# Patient Record
Sex: FEMALE
[De-identification: names, ages, dates, MRNs, and addresses within clinical notes are randomized per-mention and may not be internally consistent; named-entity substitution may affect disease eponyms.]

---

## 2022-05-10 ENCOUNTER — TRANSCRIBE ORDERS (OUTPATIENT)
Dept: OTHER | Age: 62
End: 2022-05-10

## 2022-05-10 DIAGNOSIS — N83.292 COMPLEX CYST OF LEFT OVARY: Primary | ICD-10-CM

## 2022-05-11 ENCOUNTER — PATIENT OUTREACH (OUTPATIENT)
Dept: ONCOLOGY | Facility: CLINIC | Age: 62
End: 2022-05-11

## 2022-05-11 ENCOUNTER — DOCUMENTATION ONLY (OUTPATIENT)
Dept: ONCOLOGY | Facility: CLINIC | Age: 62
End: 2022-05-11

## 2022-05-11 NOTE — PROGRESS NOTES
Action May 11, 2022 11:57 AM ABT   Action Taken Records updated in CE, image request sent to [facility]     Action May 13, 2022 4:21 PM ABT   Action Taken Image from [facility] 04/19/22 US Pelvis received and resolved to PACS

## 2022-05-11 NOTE — PROGRESS NOTES
New Patient Hematology / Oncology Nurse Navigator Note     Referral Date: 5/10/22    Referring provider:   Inderjit Antoine   [facility]   [address] [address]   Phone: [phone]   Fax: [phone]         Referring Clinic/Organization: [facility]      Referred to: [facility]    Requested provider (if applicable): First available - did not specify     Evaluation for : Complex cyst of left ovary     Clinical History (per Nurse review of records provided):       4/19/22 Pelvic US showing (viewable in CE):  CONCLUSION:     1.  4.2 x 3.1 x 4.4 cm diameter hyperechoic left adnexal mass with suspected central cystic   changes with either posterior through transmission or possibly layering calcium.     Sonographically this is most consistent with a left ovarian dermoid, however, considering   patient's age group I would suggest proceeding with a pelvic MRI for further characterization.   2.  Endometrial stripe measures between 6 and 7.6 mm which is mildly prominent for   postmenopausal stage.  Endometrium and uterus otherwise demonstrates no other cause for   postmenopausal bleeding.     5/5/22 Path from Endometrial Biopsy showing:  Final Diagnosis     A. ENDOMETRIUM, BIOPSY  --PREDOMINANTLY MUCUS AND ENDOCERVIX WITH MINUTE DETACHED FRAGMENTS OF SUPERFICIAL ENDOMETRIUM  --NO HYPERPLASIA OR CARCINOMA IDENTIFIED ON VERY LIMITED SAMPLING     B. ENDOCERVIX, BIOPSY  --ENDOCERVICAL POLYP     -- BOOKMARKED    3/25//21 PAP/HPV -- BOOKMARKED    5/5/22 Consult with OBGYN     Clinical Assessment / Barriers to Care (Per Nurse):    Pt lives in [facility]       Records Location: Care Everywhere     Records Needed:     Images from [facility], path from endometrial biopsy    Referral updates and Plan:     Attempted to reach patient. Left VM notifying pt referral has been received from [facility]. Introduced self and role as nurse navigator. Provided contact information if questions or concerns. Notified  patient scheduling will be reaching out to coordinate visit pending location preference.     Tanja Kim, DAVIDN, RN, PHN, OCN  Hematology/Oncology Nurse Navigator  [facility]  [phone]